# Patient Record
Sex: FEMALE
[De-identification: names, ages, dates, MRNs, and addresses within clinical notes are randomized per-mention and may not be internally consistent; named-entity substitution may affect disease eponyms.]

---

## 2021-04-09 ENCOUNTER — NURSE TRIAGE (OUTPATIENT)
Dept: OTHER | Facility: CLINIC | Age: 86
End: 2021-04-09

## 2021-04-09 NOTE — TELEPHONE ENCOUNTER
Brief description of triage: Katalina Kowalski has [facility] and is calling the nurse line today asking for advise for her 8 year old mothers constipation. Please see triage below for more detailed information. Triage indicates for home care    Care advice provided, patient verbalizes understanding; denies any other questions or concerns; instructed to call back for any new or worsening symptoms. This triage is a result of a call to [address] [place] [address]. Please do not respond to the triage nurse through this encounter. Any subsequent communication should be directly with the patient. Reason for Disposition   Mild constipation    Answer Assessment - Initial Assessment Questions  1. STOOL PATTERN OR FREQUENCY: \"How often do you pass bowel movements (BMs)? \"  (Normal range: tid to q 3 days)  \"When was the last BM passed? \"        Once a day    2. STRAINING: \"Do you have to strain to have a BM? \"       Not usually    3. RECTAL PAIN: \"Does your rectum hurt when the stool comes out? \" If so, ask: \"Do you have hemorrhoids? How bad is the pain? \"  (Scale 1-10; or mild, moderate, severe)      No    4. STOOL COMPOSITION: \"Are the stools hard? \"       Normally is more on the softer side    5. BLOOD ON STOOLS: \"Has there been any blood on the toilet tissue or on the surface of the BM? \" If so, ask: \"When was the last time? \"       No    6. CHRONIC CONSTIPATION: \"Is this a new problem for you? \"  If no, ask: \"How long have you had this problem? \" (days, weeks, months)       No    7. CHANGES IN DIET: \"Have there been any recent changes in your diet? \"       No    8. MEDICATIONS: Luiza Finders you been taking any new medications? \"      No    9. LAXATIVES: \"Have you been using any laxatives or enemas? \"  If yes, ask \"What, how often, and when was the last time? \"      No laxatives yet    10. CAUSE: \"What do you think is causing the constipation? \"         Back spasms which is causing decreased movement    11.  OTHER SYMPTOMS: \"Do you have any other symptoms? \" (e.g., abdominal pain, fever, vomiting)        No abd pain, No vomiting and no fever    12. PREGNANCY: \"Is there any chance you are pregnant? \" \"When was your last menstrual period? \"        NA    Protocols used: CONSTIPATION-ADULT-[place]